# Patient Record
Sex: MALE | Race: WHITE
[De-identification: names, ages, dates, MRNs, and addresses within clinical notes are randomized per-mention and may not be internally consistent; named-entity substitution may affect disease eponyms.]

---

## 2018-02-18 ENCOUNTER — HOSPITAL ENCOUNTER (EMERGENCY)
Dept: HOSPITAL 46 - ED | Age: 58
Discharge: HOME | End: 2018-02-18
Payer: COMMERCIAL

## 2018-02-18 VITALS — HEIGHT: 66 IN | WEIGHT: 175 LBS | BODY MASS INDEX: 28.12 KG/M2

## 2018-02-18 DIAGNOSIS — S61.012A: Primary | ICD-10-CM

## 2018-02-18 DIAGNOSIS — Z79.82: ICD-10-CM

## 2018-02-18 DIAGNOSIS — Z88.8: ICD-10-CM
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
## 2018-02-18 DIAGNOSIS — I10: ICD-10-CM

## 2018-02-18 DIAGNOSIS — Z23: ICD-10-CM

## 2018-02-18 DIAGNOSIS — Z79.899: ICD-10-CM

## 2018-02-18 PROCEDURE — 0HQGXZZ REPAIR LEFT HAND SKIN, EXTERNAL APPROACH: ICD-10-PCS | Performed by: EMERGENCY MEDICINE

## 2018-02-18 NOTE — XMS
Clinical Summary
  Created on: 2018
 
 Yeyo Aquino
 External Reference #: [id number]
 : 60
 Sex: Male
 
 Demographics
 
 
+-----------------------+------------------------+
| Address               | [address]             |
|                       | [place]  [address]   |
+-----------------------+------------------------+
| Home Phone            | [phone]        |
+-----------------------+------------------------+
| Preferred Language    | Unknown                |
+-----------------------+------------------------+
| Marital Status        |                 |
+-----------------------+------------------------+
| Mu-ism Affiliation | NON                    |
+-----------------------+------------------------+
| Race                  | White                  |
+-----------------------+------------------------+
| Ethnic Group          | Not  or  |
+-----------------------+------------------------+
 
 
 Author
 
 
+--------------+------------------------+
| Author       | [facility] |
+--------------+------------------------+
| Organization | [facility] |
+--------------+------------------------+
| Address      | Unknown                |
+--------------+------------------------+
| Phone        | Unavailable            |
+--------------+------------------------+
 
 
 
 Support
 
 
+--------------------+--------------+---------+-----------------+
| Name               | Relationship | Address | Phone           |
+--------------------+--------------+---------+-----------------+
| Mechelle Aquino | ECON         | Unknown | [phone] |
+--------------------+--------------+---------+-----------------+
 
 
 
 Care Team Providers
 
 
 
+-------------------------+------+-----------------+
| Care Team Member Name   | Role | Phone           |
+-------------------------+------+-----------------+
| Brian Bloom MD | PP   | [phone] |
+-------------------------+------+-----------------+
 
 
 
 Source Comments
 [facility] is fully live on both [facility] and [facility]
 
 Allergies
 
 
+----------------+-----------+----------+----------+----------------------+
| Active Allergy | Reactions | Severity | Noted    | Comments             |
|                |           |          | Date     |                      |
+----------------+-----------+----------+----------+----------------------+
| Chlorhexidine  | Rash      | Medium   | 20 |   Pt with red raised |
| Towelette      |           |          | 14       |  patchy rash from    |
|                |           |          |          | chlorhexidine skip   |
|                |           |          |          | prep.  Pt reports    |
|                |           |          |          | skin very itchy and  |
|                |           |          |          | required oral        |
|                |           |          |          | benadryl.            |
+----------------+-----------+----------+----------+----------------------+
 
 
 
 Current Medications
 
 
+----------------------+----------------------+---------+---------+------+------+-------+
| Prescription         | Sig.                 | Disp.   | Refills | Star | End  | Statu |
|                      |                      |         |         | t    | Date | s     |
|                      |                      |         |         | Date |      |       |
+----------------------+----------------------+---------+---------+------+------+-------+
|   magnesium oxide    | Take 2 tablets by    |   120   | 0       | 06/0 |      | Activ |
| 400 mg oral          | mouth two times      | tablet  |         | 2/20 |      | e     |
| tabletIndications:   | daily. Indications:  |         |         | 14   |      |       |
| hypomagnesemia       | HYPOMAGNESEMIA       |         |         |      |      |       |
+----------------------+----------------------+---------+---------+------+------+-------+
|   midodrine 10 mg    | Take 1 tablet by     |   30    | 0       | 06/0 |      | Activ |
| oral tablet          | mouth as needed      | tablet  |         | 2/20 |      | e     |
|                      | while in HSD (May    |         |         | 14   |      |       |
|                      | give during HD x1    |         |         |      |      |       |
|                      | for SBP less than    |         |         |      |      |       |
|                      | 90.). Administer     |         |         |      |      |       |
|                      | doses in 3-4 hour    |         |         |      |      |       |
|                      | intervals. Avoid     |         |         |      |      |       |
|                      | dosing after the     |         |         |      |      |       |
|                      | evening meal or      |         |         |      |      |       |
|                      | within 4 hours of    |         |         |      |      |       |
|                      | bedtime.             |         |         |      |      |       |
+----------------------+----------------------+---------+---------+------+------+-------+
|   omeprazole 20 mg   | Take 1 capsule by    |   60    | 0       | 06/0 |      | Activ |
| oral capsule,delayed | mouth two times      | capsule |         | 2/20 |      | e     |
|  release(DR/EC)      | daily.               |         |         | 14   |      |       |
+----------------------+----------------------+---------+---------+------+------+-------+
 
|   metoprolol         | Take 25 mg by mouth  |         |         |      |      | Activ |
| succinate 25 mg oral | once daily.          |         |         |      |      | e     |
|  tablet extended     |                      |         |         |      |      |       |
| release 24 hr        |                      |         |         |      |      |       |
+----------------------+----------------------+---------+---------+------+------+-------+
|   VIT B CMPLX        | Take  by mouth.      |         |         |      |      | Activ |
| 3/FA/VIT C/BIOTIN    |                      |         |         |      |      | e     |
| (NEPHRO-DANIELLE RX      |                      |         |         |      |      |       |
| ORAL)                |                      |         |         |      |      |       |
+----------------------+----------------------+---------+---------+------+------+-------+
|   FUROSEMIDE ORAL    | Take 50 mg by mouth. |         |         |      |      | Activ |
|                      |                      |         |         |      |      | e     |
+----------------------+----------------------+---------+---------+------+------+-------+
|   spironolactone 50  | Take 50 mg by mouth  |         |         |      |      | Activ |
| mg oral tablet       | once daily.          |         |         |      |      | e     |
+----------------------+----------------------+---------+---------+------+------+-------+
|   aspirin 325 mg     | Take 325 mg by mouth |         |         |      |      | Activ |
| oral tablet          |  once daily.         |         |         |      |      | e     |
+----------------------+----------------------+---------+---------+------+------+-------+
|   FOLIC              | Take  by mouth.      |         |         |      |      | Activ |
| ACID/MULTIVITS-MIN/L |                      |         |         |      |      | e     |
| UT (CENTRUM SILVER   |                      |         |         |      |      |       |
| ORAL)                |                      |         |         |      |      |       |
+----------------------+----------------------+---------+---------+------+------+-------+
 
 
 
 Active Problems
 
 
+------------------------------------------------+------------+
| Problem                                        | Noted Date |
+------------------------------------------------+------------+
| Supraventricular tachycardia, paroxysmal (HCC) | 2014 |
+------------------------------------------------+------------+
 
 
 
+-------------------------------------------------------------------+
|   Overview:   Short-RP narrow complex starting  in setting of |
|  hypomagnesemia. Asx. Suspect Atrial tachycardia, AVRNT or AVRT.  |
+-------------------------------------------------------------------+
 
 
 
+-------------------------------------------------+------------+
| Hypomagnesemia                                  | 2014 |
+-------------------------------------------------+------------+
| Severe protein-calorie malnutrition (HCC)       | 2014 |
+-------------------------------------------------+------------+
| Acute renal failure with tubular necrosis (HCC) | 2014 |
+-------------------------------------------------+------------+
| Acute alcoholic hepatitis                       | 2014 |
+-------------------------------------------------+------------+
| Acute upper GI bleeding                         | 2014 |
+-------------------------------------------------+------------+
 
 
 
+-----------------------------------------+
 
|   Overview:   Due to kelli wiess tear |
+-----------------------------------------+
 
 
 
+--------------------------+------------+
| Metabolic acidosis       | 2014 |
+--------------------------+------------+
| Metabolic encephalopathy | 2014 |
+--------------------------+------------+
 
 
 
+--------------------------------------------------------+
|   Overview:   Due to hepatic encephalopathy and uremia |
+--------------------------------------------------------+
 
 
 
+-------------------------+------------+
| Acute blood loss anemia | 2014 |
+-------------------------+------------+
| Alcoholism (HCC)        | 2014 |
+-------------------------+------------+
| Thrombocytopenia (HCC)  | 2014 |
+-------------------------+------------+
| Coagulopathy (HCC)      | 2014 |
+-------------------------+------------+
| Hypotension             | 2014 |
+-------------------------+------------+
 
 
 
+------------------------------------+
|   Overview:   Due to liver disease |
+------------------------------------+
 
 
 
+-----------------------------------------+------------+
| Hepatic failure due to alcoholism (HCC) | 2014 |
+-----------------------------------------+------------+
 
 
 
 Immunizations
 
 
+-----------------+------------------------+----------+
| Name            | Dates Previously Given | Next Due |
+-----------------+------------------------+----------+
| HepA-Adult      | 2014             |          |
+-----------------+------------------------+----------+
| HepB-Adult      | 2014             |          |
+-----------------+------------------------+----------+
| Pneumococcal 23 | 2014             |          |
+-----------------+------------------------+----------+
 
 
 
 
 Social History
 
 
+---------------+-------+-----------+--------+------+
| Tobacco Use   | Types | Packs/Day | Years  | Date |
|               |       |           | Used   |      |
+---------------+-------+-----------+--------+------+
| Former Smoker |       |           |        |      |
+---------------+-------+-----------+--------+------+
 
 
 
+-------------+-----------+---------+---------------------------------------------+
| Alcohol Use | Drinks/We | oz/Week | Comments                                    |
|             | ek        |         |                                             |
+-------------+-----------+---------+---------------------------------------------+
| Yes         |           |         | Sober for 2 weeks.  Had been drinking a 5th |
|             |           |         |  of liqour daily prior to admission         |
+-------------+-----------+---------+---------------------------------------------+
 
 
 
+------------------+---------------+
| Sex Assigned at  | Date Recorded |
| Birth            |               |
+------------------+---------------+
| Not on file      |               |
+------------------+---------------+
 
 
 
 Last Filed Vital Signs
 
 
+-------------------+----------------------+-------------------------+
| Vital Sign        | Reading              | Time Taken              |
+-------------------+----------------------+-------------------------+
| Blood Pressure    | 138/79               | 2015  1:11 PM PST |
+-------------------+----------------------+-------------------------+
| Pulse             | 78                   | 2015  1:11 PM PST |
+-------------------+----------------------+-------------------------+
| Temperature       | 36.6   C (97.8   F)  | 2015  1:11 PM PST |
+-------------------+----------------------+-------------------------+
| Respiratory Rate  | 16                   | 2015  1:11 PM PST |
+-------------------+----------------------+-------------------------+
| Oxygen Saturation | 100%                 | 2014  3:40 PM PDT |
+-------------------+----------------------+-------------------------+
| Inhaled Oxygen    | -                    | -                       |
| Concentration     |                      |                         |
+-------------------+----------------------+-------------------------+
| Weight            | 73.5 kg (162 lb 1.6  | 2015  1:11 PM PST |
|                   | oz)                  |                         |
+-------------------+----------------------+-------------------------+
| Height            | 167.6 cm (5' 6")     | 2015  1:11 PM PST |
+-------------------+----------------------+-------------------------+
| Body Mass Index   | 26.16                | 2015  1:11 PM PST |
+-------------------+----------------------+-------------------------+
 
 
 
 
 Plan of Treatment
 
 
+--------------------+-----------+-----------+----------+
| Health Maintenance | Due Date  | Last Done | Comments |
+--------------------+-----------+-----------+----------+
| INFLUENZA VACCINE  |  |           |          |
| (FLU SHOT)         | 7         |           |          |
+--------------------+-----------+-----------+----------+
 
 
 
 Results
 Not on filefrom Last 3 Months

## 2018-02-18 NOTE — XMS
Clinical Summary
  Created on: 2018
 
 Yeyo Aquino
 External Reference #: [id number]
 : 60
 Sex: Male
 
 Demographics
 
 
+-----------------------+------------------------+
| Address               | [address]             |
|                       | [place]  [address]   |
+-----------------------+------------------------+
| Home Phone            | [phone]        |
+-----------------------+------------------------+
| Preferred Language    | Unknown                |
+-----------------------+------------------------+
| Marital Status        |                 |
+-----------------------+------------------------+
| Sabianism Affiliation | NON                    |
+-----------------------+------------------------+
| Race                  | White                  |
+-----------------------+------------------------+
| Ethnic Group          | Not  or  |
+-----------------------+------------------------+
 
 
 Author
 
 
+--------------+------------------------+
| Author       | [facility] |
+--------------+------------------------+
| Organization | [facility] |
+--------------+------------------------+
| Address      | Unknown                |
+--------------+------------------------+
| Phone        | Unavailable            |
+--------------+------------------------+
 
 
 
 Support
 
 
+--------------------+--------------+---------+-----------------+
| Name               | Relationship | Address | Phone           |
+--------------------+--------------+---------+-----------------+
| Mechelle Aquino | ECON         | Unknown | [phone] |
+--------------------+--------------+---------+-----------------+
 
 
 
 Care Team Providers
 
 
 
+-------------------------+------+-----------------+
| Care Team Member Name   | Role | Phone           |
+-------------------------+------+-----------------+
| Brian Bloom MD | PP   | [phone] |
+-------------------------+------+-----------------+
 
 
 
 Source Comments
 [facility] is fully live on both [facility] and [facility]Critical access hospital & [facility]
 
 Allergies
 
 
+----------------+-----------+----------+----------+----------------------+
| Active Allergy | Reactions | Severity | Noted    | Comments             |
|                |           |          | Date     |                      |
+----------------+-----------+----------+----------+----------------------+
| Chlorhexidine  | Rash      | Medium   | 20 |   Pt with red raised |
| Towelette      |           |          | 14       |  patchy rash from    |
|                |           |          |          | chlorhexidine skip   |
|                |           |          |          | prep.  Pt reports    |
|                |           |          |          | skin very itchy and  |
|                |           |          |          | required oral        |
|                |           |          |          | benadryl.            |
+----------------+-----------+----------+----------+----------------------+
 
 
 
 Current Medications
 
 
+----------------------+----------------------+---------+---------+------+------+-------+
| Prescription         | Sig.                 | Disp.   | Refills | Star | End  | Statu |
|                      |                      |         |         | t    | Date | s     |
|                      |                      |         |         | Date |      |       |
+----------------------+----------------------+---------+---------+------+------+-------+
|   magnesium oxide    | Take 2 tablets by    |   120   | 0       | 06/0 |      | Activ |
| 400 mg oral          | mouth two times      | tablet  |         | 2/20 |      | e     |
| tabletIndications:   | daily. Indications:  |         |         | 14   |      |       |
| hypomagnesemia       | HYPOMAGNESEMIA       |         |         |      |      |       |
+----------------------+----------------------+---------+---------+------+------+-------+
|   midodrine 10 mg    | Take 1 tablet by     |   30    | 0       | 06/0 |      | Activ |
| oral tablet          | mouth as needed      | tablet  |         | 2/20 |      | e     |
|                      | while in HSD (May    |         |         | 14   |      |       |
|                      | give during HD x1    |         |         |      |      |       |
|                      | for SBP less than    |         |         |      |      |       |
|                      | 90.). Administer     |         |         |      |      |       |
|                      | doses in 3-4 hour    |         |         |      |      |       |
|                      | intervals. Avoid     |         |         |      |      |       |
|                      | dosing after the     |         |         |      |      |       |
|                      | evening meal or      |         |         |      |      |       |
|                      | within 4 hours of    |         |         |      |      |       |
|                      | bedtime.             |         |         |      |      |       |
+----------------------+----------------------+---------+---------+------+------+-------+
|   omeprazole 20 mg   | Take 1 capsule by    |   60    | 0       | 06/0 |      | Activ |
| oral capsule,delayed | mouth two times      | capsule |         | 2/20 |      | e     |
|  release(DR/EC)      | daily.               |         |         | 14   |      |       |
+----------------------+----------------------+---------+---------+------+------+-------+
 
|   metoprolol         | Take 25 mg by mouth  |         |         |      |      | Activ |
| succinate 25 mg oral | once daily.          |         |         |      |      | e     |
|  tablet extended     |                      |         |         |      |      |       |
| release 24 hr        |                      |         |         |      |      |       |
+----------------------+----------------------+---------+---------+------+------+-------+
|   VIT B CMPLX        | Take  by mouth.      |         |         |      |      | Activ |
| 3/FA/VIT C/BIOTIN    |                      |         |         |      |      | e     |
| (NEPHRO-DANIELLE RX      |                      |         |         |      |      |       |
| ORAL)                |                      |         |         |      |      |       |
+----------------------+----------------------+---------+---------+------+------+-------+
|   FUROSEMIDE ORAL    | Take 50 mg by mouth. |         |         |      |      | Activ |
|                      |                      |         |         |      |      | e     |
+----------------------+----------------------+---------+---------+------+------+-------+
|   spironolactone 50  | Take 50 mg by mouth  |         |         |      |      | Activ |
| mg oral tablet       | once daily.          |         |         |      |      | e     |
+----------------------+----------------------+---------+---------+------+------+-------+
|   aspirin 325 mg     | Take 325 mg by mouth |         |         |      |      | Activ |
| oral tablet          |  once daily.         |         |         |      |      | e     |
+----------------------+----------------------+---------+---------+------+------+-------+
|   FOLIC              | Take  by mouth.      |         |         |      |      | Activ |
| ACID/MULTIVITS-MIN/L |                      |         |         |      |      | e     |
| UT (CENTRUM SILVER   |                      |         |         |      |      |       |
| ORAL)                |                      |         |         |      |      |       |
+----------------------+----------------------+---------+---------+------+------+-------+
 
 
 
 Active Problems
 
 
+------------------------------------------------+------------+
| Problem                                        | Noted Date |
+------------------------------------------------+------------+
| Supraventricular tachycardia, paroxysmal (HCC) | 2014 |
+------------------------------------------------+------------+
 
 
 
+-------------------------------------------------------------------+
|   Overview:   Short-RP narrow complex starting  in setting of |
|  hypomagnesemia. Asx. Suspect Atrial tachycardia, AVRNT or AVRT.  |
+-------------------------------------------------------------------+
 
 
 
+-------------------------------------------------+------------+
| Hypomagnesemia                                  | 2014 |
+-------------------------------------------------+------------+
| Severe protein-calorie malnutrition (HCC)       | 2014 |
+-------------------------------------------------+------------+
| Acute renal failure with tubular necrosis (HCC) | 2014 |
+-------------------------------------------------+------------+
| Acute alcoholic hepatitis                       | 2014 |
+-------------------------------------------------+------------+
| Acute upper GI bleeding                         | 2014 |
+-------------------------------------------------+------------+
 
 
 
+-----------------------------------------+
 
|   Overview:   Due to kelli wiess tear |
+-----------------------------------------+
 
 
 
+--------------------------+------------+
| Metabolic acidosis       | 2014 |
+--------------------------+------------+
| Metabolic encephalopathy | 2014 |
+--------------------------+------------+
 
 
 
+--------------------------------------------------------+
|   Overview:   Due to hepatic encephalopathy and uremia |
+--------------------------------------------------------+
 
 
 
+-------------------------+------------+
| Acute blood loss anemia | 2014 |
+-------------------------+------------+
| Alcoholism (HCC)        | 2014 |
+-------------------------+------------+
| Thrombocytopenia (HCC)  | 2014 |
+-------------------------+------------+
| Coagulopathy (HCC)      | 2014 |
+-------------------------+------------+
| Hypotension             | 2014 |
+-------------------------+------------+
 
 
 
+------------------------------------+
|   Overview:   Due to liver disease |
+------------------------------------+
 
 
 
+-----------------------------------------+------------+
| Hepatic failure due to alcoholism (HCC) | 2014 |
+-----------------------------------------+------------+
 
 
 
 Immunizations
 
 
+-----------------+------------------------+----------+
| Name            | Dates Previously Given | Next Due |
+-----------------+------------------------+----------+
| HepA-Adult      | 2014             |          |
+-----------------+------------------------+----------+
| HepB-Adult      | 2014             |          |
+-----------------+------------------------+----------+
| Pneumococcal 23 | 2014             |          |
+-----------------+------------------------+----------+
 
 
 
 
 Social History
 
 
+---------------+-------+-----------+--------+------+
| Tobacco Use   | Types | Packs/Day | Years  | Date |
|               |       |           | Used   |      |
+---------------+-------+-----------+--------+------+
| Former Smoker |       |           |        |      |
+---------------+-------+-----------+--------+------+
 
 
 
+-------------+-----------+---------+---------------------------------------------+
| Alcohol Use | Drinks/We | oz/Week | Comments                                    |
|             | ek        |         |                                             |
+-------------+-----------+---------+---------------------------------------------+
| Yes         |           |         | Sober for 2 weeks.  Had been drinking a 5th |
|             |           |         |  of liqour daily prior to admission         |
+-------------+-----------+---------+---------------------------------------------+
 
 
 
+------------------+---------------+
| Sex Assigned at  | Date Recorded |
| Birth            |               |
+------------------+---------------+
| Not on file      |               |
+------------------+---------------+
 
 
 
 Last Filed Vital Signs
 
 
+-------------------+----------------------+-------------------------+
| Vital Sign        | Reading              | Time Taken              |
+-------------------+----------------------+-------------------------+
| Blood Pressure    | 138/79               | 2015  1:11 PM PST |
+-------------------+----------------------+-------------------------+
| Pulse             | 78                   | 2015  1:11 PM PST |
+-------------------+----------------------+-------------------------+
| Temperature       | 36.6   C (97.8   F)  | 2015  1:11 PM PST |
+-------------------+----------------------+-------------------------+
| Respiratory Rate  | 16                   | 2015  1:11 PM PST |
+-------------------+----------------------+-------------------------+
| Oxygen Saturation | 100%                 | 2014  3:40 PM PDT |
+-------------------+----------------------+-------------------------+
| Inhaled Oxygen    | -                    | -                       |
| Concentration     |                      |                         |
+-------------------+----------------------+-------------------------+
| Weight            | 73.5 kg (162 lb 1.6  | 2015  1:11 PM PST |
|                   | oz)                  |                         |
+-------------------+----------------------+-------------------------+
| Height            | 167.6 cm (5' 6")     | 2015  1:11 PM PST |
+-------------------+----------------------+-------------------------+
| Body Mass Index   | 26.16                | 2015  1:11 PM PST |
+-------------------+----------------------+-------------------------+
 
 
 
 
 Plan of Treatment
 
 
+--------------------+-----------+-----------+----------+
| Health Maintenance | Due Date  | Last Done | Comments |
+--------------------+-----------+-----------+----------+
| INFLUENZA VACCINE  |  |           |          |
| (FLU SHOT)         | 7         |           |          |
+--------------------+-----------+-----------+----------+
 
 
 
 Results
 Not on filefrom Last 3 Months